# Patient Record
Sex: MALE | Race: WHITE | NOT HISPANIC OR LATINO | Employment: FULL TIME | URBAN - METROPOLITAN AREA
[De-identification: names, ages, dates, MRNs, and addresses within clinical notes are randomized per-mention and may not be internally consistent; named-entity substitution may affect disease eponyms.]

---

## 2018-11-20 ENCOUNTER — TELEPHONE (OUTPATIENT)
Dept: SURGERY | Facility: CLINIC | Age: 53
End: 2018-11-20

## 2018-11-21 ENCOUNTER — TELEPHONE (OUTPATIENT)
Dept: SURGERY | Facility: CLINIC | Age: 53
End: 2018-11-21

## 2018-11-21 NOTE — TELEPHONE ENCOUNTER
----- Message from Primitivo Moore LPN sent at 11/20/2018 11:11 AM CST -----  Contact: pt  Called n/a  ----- Message -----  From: Kelsie Pretty  Sent: 11/20/2018  11:01 AM  To: Chau GARCIA Staff    ..Type:  Patient Returning Call    Who Called: Pt  Who Left Message for Patient: Primitivo  Does the patient know what this is regarding?: pt wasn't sure  Best Call Back Number:   Additional Information: Pt returned a missed call   Please advise  Thanks

## 2019-01-02 ENCOUNTER — TELEPHONE (OUTPATIENT)
Dept: SURGERY | Facility: CLINIC | Age: 54
End: 2019-01-02

## 2020-11-20 PROBLEM — Z12.11 COLON CANCER SCREENING: Status: ACTIVE | Noted: 2020-11-20

## 2023-04-21 ENCOUNTER — NURSE TRIAGE (OUTPATIENT)
Dept: ADMINISTRATIVE | Facility: CLINIC | Age: 58
End: 2023-04-21

## 2023-04-21 NOTE — TELEPHONE ENCOUNTER
Darrius requesting advice for hand laceration. States he has a cut to the palm of left hand that occurred yesterday when cutting grass, tripped over metal fencing and ceramic bowl that he was holding broke in his hand.  States he went to ED yesterday after incident, site cleansed and bandaged in ED. Was told he needs stiches & needs to see Ortho hand specialist. Pt states he left ED because he did not want to wait there anymore. Darrius states he knows he still needs stiches and has tendon sticking out of index finger. Denies active bleeding. Advised per triage protocol to go to nearest ED now for physician eval. V/u.   Reason for Disposition   [1] Deep cut AND [2] can see bone or tendons    Additional Information   Negative: [1] Major bleeding (e.g., actively dripping or spurting) AND [2] can't be stopped   Negative: Amputation   Negative: Shock suspected (e.g., cold/pale/clammy skin, too weak to stand, low BP, rapid pulse)   Negative: [1] Knife wound (or other possibly deep cut) AND [2] to chest, abdomen, back, neck, or head   Negative: [1] Self-injury (e.g., cutting, self-harm) AND [2] suicidal or out-of-control   Negative: Sounds like a life-threatening emergency to the triager   Negative: [1] Bleeding AND [2] won't stop after 10 minutes of direct pressure (using correct technique)   Negative: Skin is split open or gaping (or length > 1/2 inch or 12 mm on the skin, 1/4 inch or 6 mm on the face)    Protocols used: Cuts and [place]-E-QA

## 2024-12-26 ENCOUNTER — TELEPHONE (OUTPATIENT)
Dept: PSYCHOLOGY | Facility: CLINIC | Age: 59
End: 2024-12-26

## 2025-03-14 ENCOUNTER — HOSPITAL ENCOUNTER (EMERGENCY)
Facility: HOSPITAL | Age: 60
Discharge: LEFT BEFORE DISCHARGE | End: 2025-03-14
Attending: EMERGENCY MEDICINE
Payer: MEDICAID

## 2025-03-14 VITALS
RESPIRATION RATE: 18 BRPM | HEIGHT: 71 IN | WEIGHT: 180 LBS | HEART RATE: 72 BPM | SYSTOLIC BLOOD PRESSURE: 155 MMHG | BODY MASS INDEX: 25.2 KG/M2 | DIASTOLIC BLOOD PRESSURE: 87 MMHG | OXYGEN SATURATION: 95 % | TEMPERATURE: 98 F

## 2025-03-14 DIAGNOSIS — R52 PAIN: ICD-10-CM

## 2025-03-14 DIAGNOSIS — G89.18 POST-OPERATIVE PAIN: Primary | ICD-10-CM

## 2025-03-14 LAB
ALBUMIN SERPL BCP-MCNC: 3.9 G/DL (ref 3.5–5.2)
ALP SERPL-CCNC: 82 U/L (ref 40–150)
ALT SERPL W/O P-5'-P-CCNC: 28 U/L (ref 10–44)
ANION GAP SERPL CALC-SCNC: 15 MMOL/L (ref 8–16)
AST SERPL-CCNC: 55 U/L (ref 10–40)
BASOPHILS # BLD AUTO: 0.03 K/UL (ref 0–0.2)
BASOPHILS NFR BLD: 0.4 % (ref 0–1.9)
BILIRUB SERPL-MCNC: 0.5 MG/DL (ref 0.1–1)
BUN SERPL-MCNC: 16 MG/DL (ref 8–23)
CALCIUM SERPL-MCNC: 9.4 MG/DL (ref 8.7–10.5)
CHLORIDE SERPL-SCNC: 106 MMOL/L (ref 95–110)
CO2 SERPL-SCNC: 18 MMOL/L (ref 23–29)
CREAT SERPL-MCNC: 0.9 MG/DL (ref 0.5–1.4)
CRP SERPL-MCNC: 2.4 MG/L (ref 0–8.2)
DIFFERENTIAL METHOD BLD: ABNORMAL
EOSINOPHIL # BLD AUTO: 0.2 K/UL (ref 0–0.5)
EOSINOPHIL NFR BLD: 2.1 % (ref 0–8)
ERYTHROCYTE [DISTWIDTH] IN BLOOD BY AUTOMATED COUNT: 14.3 % (ref 11.5–14.5)
EST. GFR  (NO RACE VARIABLE): >60 ML/MIN/1.73 M^2
GLUCOSE SERPL-MCNC: 82 MG/DL (ref 70–110)
HCT VFR BLD AUTO: 47.3 % (ref 40–54)
HGB BLD-MCNC: 16.5 G/DL (ref 14–18)
IMM GRANULOCYTES # BLD AUTO: 0.02 K/UL (ref 0–0.04)
IMM GRANULOCYTES NFR BLD AUTO: 0.3 % (ref 0–0.5)
LYMPHOCYTES # BLD AUTO: 2.5 K/UL (ref 1–4.8)
LYMPHOCYTES NFR BLD: 34.7 % (ref 18–48)
MCH RBC QN AUTO: 34 PG (ref 27–31)
MCHC RBC AUTO-ENTMCNC: 34.9 G/DL (ref 32–36)
MCV RBC AUTO: 98 FL (ref 82–98)
MONOCYTES # BLD AUTO: 0.7 K/UL (ref 0.3–1)
MONOCYTES NFR BLD: 10.1 % (ref 4–15)
NEUTROPHILS # BLD AUTO: 3.7 K/UL (ref 1.8–7.7)
NEUTROPHILS NFR BLD: 52.4 % (ref 38–73)
NRBC BLD-RTO: 0 /100 WBC
PLATELET # BLD AUTO: 153 K/UL (ref 150–450)
PMV BLD AUTO: 9.2 FL (ref 9.2–12.9)
POTASSIUM SERPL-SCNC: 4.3 MMOL/L (ref 3.5–5.1)
PROT SERPL-MCNC: 7.6 G/DL (ref 6–8.4)
RBC # BLD AUTO: 4.85 M/UL (ref 4.6–6.2)
SODIUM SERPL-SCNC: 139 MMOL/L (ref 136–145)
WBC # BLD AUTO: 7.06 K/UL (ref 3.9–12.7)

## 2025-03-14 PROCEDURE — 85025 COMPLETE CBC W/AUTO DIFF WBC: CPT

## 2025-03-14 PROCEDURE — 99284 EMERGENCY DEPT VISIT MOD MDM: CPT | Mod: 25

## 2025-03-14 PROCEDURE — 80053 COMPREHEN METABOLIC PANEL: CPT

## 2025-03-14 PROCEDURE — 86140 C-REACTIVE PROTEIN: CPT

## 2025-03-14 NOTE — ED NOTES
Pt is a 58 yo male who presents for R shoulder pain. Hx of should surgery. No other complaints stated.      LOC: The patient is awake, alert and aware of environment with an appropriate affect, the patient is oriented x 4 and speaking appropriately.   APPEARANCE: Patient appears comfortable and in no acute distress, patient is clean and well groomed.  SKIN: The skin is warm and dry, color consistent with ethnicity.   MUSCULOSKELETAL: Patient moving all extremities spontaneously, no swelling noted. +R shoulder pain, sensation intact. Radial pulse 2+/  RESPIRATORY: Airway is open and patent, respirations are spontaneous, patient has a normal effort and rate, no accessory muscle use noted.  CARDIAC: Patient has a normal rate and regular rhythm, no edema noted, capillary refill < 3 seconds.   GASTRO: Soft and non tender to palpation, no distention noted.   : Pt denies any pain or frequency with urination.  NEURO: Pt opens eyes spontaneously, behavior appropriate to situation, follows commands.

## 2025-03-14 NOTE — ED PROVIDER NOTES
Encounter Date: 3/14/2025       History     Chief Complaint   Patient presents with    Shoulder Pain     Nov 2024  2nd shoulder replacement, having more pain     59-year-old male with a past medical history of hypertension, hyperlipidemia, tobacco use, DVT on Eliquis who presents to the emergency department with right shoulder pain.  He reports he underwent a total shoulder replacement in November of 2024 at [facility] that was complicated by hardware displacement requiring a revision surgery 2 days later.  He reports severe, persistent right shoulder pain since this all occurred. He was evaluated by the Orthopedic Department at [facility] in January 2025 and was found to have no acute changes on his x-ray.  Despite this, he remains concerned that the hardware is not in the correct position and wants to be seen at [facility] for a second opinion.  Patient is not taking anything for pain and is requesting Suboxone for pain management. He states he does not want to take any narcotics for fear of developing addiction.  He reports he was going to physical therapy for about 6 weeks but reported little improvement in his pain and function so he stopped going.  He is unable to sleep because the pain is so severe. He denies fever, chills, warmth or swelling to the joint, chest pain, shortness of breath, hemoptysis, unilateral leg swelling or pain.    The history is provided by the patient and a relative.     Review of patient's allergies indicates:   Allergen Reactions    Codeine Hives     No past medical history on file.  Past Surgical History:   Procedure Laterality Date    COLONOSCOPY  11/20/2020    colonoscopy per  11/20/2020    COLONOSCOPY N/A 11/20/2020    Procedure: COLONOSCOPY;  Surgeon: Benjamin Ortega MD;  Location: [facility];  Service: Endoscopy;  Laterality: N/A;     No family history on file.  Social History[1]  Review of Systems    Physical Exam     Initial Vitals [03/14/25 1619]   BP Pulse Resp Temp SpO2   117/89 90  20 98.3 °F (36.8 °C) 96 %      MAP       --         Physical Exam    Nursing note and vitals reviewed.  Constitutional: He appears well-developed. He is not diaphoretic. No distress.   HENT:   Head: Normocephalic and atraumatic.   Eyes: EOM are normal. Pupils are equal, round, and reactive to light.   Neck:   Normal range of motion.  Cardiovascular:  Normal rate.           Pulmonary/Chest: Breath sounds normal. No respiratory distress.   Musculoskeletal:      Cervical back: Normal range of motion.      Comments: Right shoulder with decreased passive and active range of motion.  Pain with flexion, extension, and abduction of right arm.  Positive empty can test, positive lift-off test.  Right shoulder abduction to 90°, flexion of right arm to 45°.  Lower extremities without edema or calf tenderness to palpation.     Neurological: He is alert and oriented to person, place, and time. He has normal strength. No cranial nerve deficit or sensory deficit. GCS score is 15. GCS eye subscore is 4. GCS verbal subscore is 5. GCS motor subscore is 6.   Skin: Skin is warm and dry.   No erythema or swelling to the right shoulder.  Surgical incision clean, dry, and intact.   Psychiatric: He has a normal mood and affect.         ED Course   Procedures  Labs Reviewed   CBC W/ AUTO DIFFERENTIAL - Abnormal       Result Value    WBC 7.06      RBC 4.85      Hemoglobin 16.5      Hematocrit 47.3      MCV 98      MCH 34.0 (*)     MCHC 34.9      RDW 14.3      Platelets 153      MPV 9.2      Immature Granulocytes 0.3      Gran # (ANC) 3.7      Immature Grans (Abs) 0.02      Lymph # 2.5      Mono # 0.7      Eos # 0.2      Baso # 0.03      nRBC 0      Gran % 52.4      Lymph % 34.7      Mono % 10.1      Eosinophil % 2.1      Basophil % 0.4      Differential Method Automated     COMPREHENSIVE METABOLIC PANEL - Abnormal    Sodium 139      Potassium 4.3      Chloride 106      CO2 18 (*)     Glucose 82      BUN 16      Creatinine 0.9      Calcium 9.4       Total Protein 7.6      Albumin 3.9      Total Bilirubin 0.5      Alkaline Phosphatase 82      AST 55 (*)     ALT 28      eGFR >60.0      Anion Gap 15      Narrative:     ADD ON CRP PER [facility]O VERMA/ORDER# [id number]   C-REACTIVE PROTEIN   C-REACTIVE PROTEIN    CRP 2.4      Narrative:     ADD ON CRP PER [facility]O VERMA/ORDER# [id number]          Imaging Results              X-Ray Shoulder Trauma Right (Final result)  Result time 03/14/25 19:12:43      Final result by Leland Joshi MD (03/14/25 19:12:43)                   Impression:      1. No convincing acute displaced fracture or dislocation of the right shoulder allowing for positioning.  Please see above.      Electronically signed by: Leland Joshi MD  Date:    03/14/2025  Time:    19:12               Narrative:    EXAMINATION:  XR SHOULDER TRAUMA 3 VIEW RIGHT    CLINICAL HISTORY:  Pain, unspecified    TECHNIQUE:  Three or four views of the right shoulder were performed.    COMPARISON:  None    FINDINGS:  Three views right shoulder.    Right shoulder arthroplasty appears intact without dislocation allowing for positioning.  The acromioclavicular joint is intact.  No acute displaced right rib fracture.  There is a well corticated ossific structure along the inferomedial aspect of the glenoid, may reflect sequela of degenerative change noting no prior exams are available for comparison.                                       Medications - No data to display  Medical Decision Making  Patient is a 59 year-old male who presents to the emergency department with complaints of right shoulder pain x4 months. S/p right total shoulder replacement complicated by hardware displacement, requiring revision.  Patient is non-toxic appearing, afebrile, and hemodynamically stable.     Differential diagnosis includes but is not limited to postoperative pain, hardware displacement, septic joint, osteomyelitis, ligamentous injury, tendon injury    X-ray right shoulder  "with intact arthroplasty, no dislocation of components.  Lab work pending prior to patient eloping.    Disposition   Patient left prior to receiving results of lab work. He told the nurse "there was nothing we could do for him here ".  He left before AMA form could be signed. Referral placed to pain clinic and orthopedic services. Patient reassessed multiple times, discussed x-ray findings, and given opportunity to ask additional questions during this visit.    Amount and/or Complexity of Data Reviewed  Labs: ordered.  Radiology: ordered. Decision-making details documented in ED Course.              Attending Attestation:     Physician Attestation Statement for NP/PA:   I personally made/approved the management plan and take responsibility for the patient management.          Attending ED Notes:   STAFF ATTENDING PHYSICIAN NOTE:  I have discussed with the [facility] and agree with their management of care. I have additionally reviewed their notes, assessments, and/or procedures documented on Darrius Quintin Tavares.   ____________________  Mario Perkins MD, FAA  Emergency Medicine Staff        ED Course as of 03/25/25 0833   Fri Mar 14, 2025   1915 X-Ray Shoulder Trauma Right  Right shoulder arthroplasty appears intact without dislocation allowing for positioning. [NK]   1929 Patient was reassessed, I had a lengthy discussion where he expressed his frustration regarding his shoulder surgeries and ongoing struggle with chronic pain.  I suggested a referral to pain clinic as well as referral to our orthopedic department for a 2nd opinion as requested.  Patient was agreeable to this plan. [NK]   1955 Patient left prior to receiving results of his lab work. Fortunately, he was re-evaluated and informed of the results of his x-ray prior to leaving.  Referrals placed as discussed. [NK]      ED Course User Index  [NK] Srinivasan Walsh PA-C                           Clinical Impression:  Final diagnoses:  [R52] Pain  [G89.18] " Post-operative pain (Primary)          ED Disposition Condition    Srinivasan Baig PA-C  03/14/25 2003         [1]   Social History  Tobacco Use    Smoking status: Heavy Smoker     Current packs/day: 0.50     Types: Cigarettes   Substance Use Topics    Alcohol use: Yes     Comment: socially    Drug use: Not Currently        Supa Perkins MD  03/25/25 0843

## 2025-03-14 NOTE — DISCHARGE INSTRUCTIONS
It was a pleasure taking care of you today. I am sorry for your ongoing hernadez with shoulder pain after your operation. I truly hope you are able to find some relief.    Home Care:   - Try taking 650mg Tylenol every 8 hours as needed for pain relief until you can be seen by the pain clinic.  - Continue to take all home medications as prescribed.    Follow-Up Plan:  - Referral placed to the pain clinic and orthopedics. They should call you to schedule an appointment.  - Follow-up with primary care doctor to discuss your recent ER visit and any additional concerns that you may have.  - Additional testing and/or evaluation as directed by your primary doctor    Return to the Emergency Department for symptoms including but not limited to: persistence or worsening of symptoms, shortness of breath or chest pain, inability to drink without vomiting, passing out/fainting/ loss of consciousness, or if you have other concerns.

## 2025-03-14 NOTE — FIRST PROVIDER EVALUATION
"Medical screening examination initiated.  I have conducted a focused provider triage encounter, findings are as follows:    Brief history of present illness:  reports throbbing pain in R shoulder. Has had surgery in the past. No trauma. No redness or swelling or fever. Has chronic pain there but "something is different".  No chest pain    Vitals:    03/14/25 1619   BP: 117/89   Pulse: 90   Resp: 20   Temp: 98.3 °F (36.8 °C)   TempSrc: Oral   SpO2: 96%   Weight: 81.6 kg (180 lb)   Height: 5' 11" (1.803 m)       Pertinent physical exam:  nontoxic appearing    Brief workup plan:  xray    Preliminary workup initiated; this workup will be continued and followed by the physician or advanced practice provider that is assigned to the patient when roomed.  "